# Patient Record
Sex: FEMALE | Race: ASIAN | NOT HISPANIC OR LATINO | Employment: FULL TIME | URBAN - METROPOLITAN AREA
[De-identification: names, ages, dates, MRNs, and addresses within clinical notes are randomized per-mention and may not be internally consistent; named-entity substitution may affect disease eponyms.]

---

## 2023-03-03 LAB — SARS-COV-2 RESULT: NOT DETECTED

## 2023-03-13 LAB
ERYTHROCYTE DISTRIBUTION WIDTH (RATIO) BY AUTOMATED COUNT: 14.6 % (ref 11.5–14.5)
ERYTHROCYTE MEAN CORPUSCULAR HEMOGLOBIN CONCENTRATION (G/DL) BY AUTOMATED: 30.6 G/DL (ref 32–36)
ERYTHROCYTE MEAN CORPUSCULAR VOLUME (FL) BY AUTOMATED COUNT: 88 FL (ref 80–100)
ERYTHROCYTES (10*6/UL) IN BLOOD BY AUTOMATED COUNT: 3.02 X10E12/L (ref 4–5.2)
HEMATOCRIT (%) IN BLOOD BY AUTOMATED COUNT: 26.5 % (ref 36–46)
HEMOGLOBIN (G/DL) IN BLOOD: 8.1 G/DL (ref 12–16)
LEUKOCYTES (10*3/UL) IN BLOOD BY AUTOMATED COUNT: 7.3 X10E9/L (ref 4.4–11.3)
PLATELETS (10*3/UL) IN BLOOD AUTOMATED COUNT: 253 X10E9/L (ref 150–450)

## 2023-03-14 LAB
ALANINE AMINOTRANSFERASE (SGPT) (U/L) IN SER/PLAS: 35 U/L (ref 7–45)
ALBUMIN (G/DL) IN SER/PLAS: 3.4 G/DL (ref 3.4–5)
ALKALINE PHOSPHATASE (U/L) IN SER/PLAS: 62 U/L (ref 33–110)
ANION GAP IN SER/PLAS: 16 MMOL/L (ref 10–20)
ASPARTATE AMINOTRANSFERASE (SGOT) (U/L) IN SER/PLAS: 21 U/L (ref 9–39)
BILIRUBIN TOTAL (MG/DL) IN SER/PLAS: 0.1 MG/DL (ref 0–1.2)
CALCIUM (MG/DL) IN SER/PLAS: 8.3 MG/DL (ref 8.6–10.3)
CARBON DIOXIDE, TOTAL (MMOL/L) IN SER/PLAS: 22 MMOL/L (ref 21–32)
CHLORIDE (MMOL/L) IN SER/PLAS: 104 MMOL/L (ref 98–107)
CREATININE (MG/DL) IN SER/PLAS: 0.75 MG/DL (ref 0.5–1.05)
ERYTHROCYTE DISTRIBUTION WIDTH (RATIO) BY AUTOMATED COUNT: 14.4 % (ref 11.5–14.5)
ERYTHROCYTE MEAN CORPUSCULAR HEMOGLOBIN CONCENTRATION (G/DL) BY AUTOMATED: 30.2 G/DL (ref 32–36)
ERYTHROCYTE MEAN CORPUSCULAR VOLUME (FL) BY AUTOMATED COUNT: 87 FL (ref 80–100)
ERYTHROCYTES (10*6/UL) IN BLOOD BY AUTOMATED COUNT: 3.38 X10E12/L (ref 4–5.2)
GFR FEMALE: >90 ML/MIN/1.73M2
GLUCOSE (MG/DL) IN SER/PLAS: 76 MG/DL (ref 74–99)
HEMATOCRIT (%) IN BLOOD BY AUTOMATED COUNT: 29.5 % (ref 36–46)
HEMOGLOBIN (G/DL) IN BLOOD: 8.9 G/DL (ref 12–16)
LEUKOCYTES (10*3/UL) IN BLOOD BY AUTOMATED COUNT: 7.4 X10E9/L (ref 4.4–11.3)
PLATELETS (10*3/UL) IN BLOOD AUTOMATED COUNT: 314 X10E9/L (ref 150–450)
POTASSIUM (MMOL/L) IN SER/PLAS: 4 MMOL/L (ref 3.5–5.3)
PROTEIN TOTAL: 6.3 G/DL (ref 6.4–8.2)
SODIUM (MMOL/L) IN SER/PLAS: 138 MMOL/L (ref 136–145)
UREA NITROGEN (MG/DL) IN SER/PLAS: 10 MG/DL (ref 6–23)

## 2023-03-15 LAB
ABO GROUP (TYPE) IN BLOOD: NORMAL
ANTIBODY SCREEN: NORMAL
RH FACTOR: NORMAL

## 2023-06-07 ENCOUNTER — OFFICE VISIT (OUTPATIENT)
Dept: PRIMARY CARE | Facility: CLINIC | Age: 38
End: 2023-06-07
Payer: COMMERCIAL

## 2023-06-07 VITALS
BODY MASS INDEX: 31.54 KG/M2 | WEIGHT: 178 LBS | SYSTOLIC BLOOD PRESSURE: 129 MMHG | HEIGHT: 63 IN | DIASTOLIC BLOOD PRESSURE: 84 MMHG

## 2023-06-07 DIAGNOSIS — D50.9 IRON DEFICIENCY ANEMIA, UNSPECIFIED IRON DEFICIENCY ANEMIA TYPE: ICD-10-CM

## 2023-06-07 DIAGNOSIS — Z00.00 ROUTINE GENERAL MEDICAL EXAMINATION AT A HEALTH CARE FACILITY: Primary | ICD-10-CM

## 2023-06-07 DIAGNOSIS — G43.809 OTHER MIGRAINE WITHOUT STATUS MIGRAINOSUS, NOT INTRACTABLE: ICD-10-CM

## 2023-06-07 LAB
ALANINE AMINOTRANSFERASE (SGPT) (U/L) IN SER/PLAS: 74 U/L (ref 7–45)
ALBUMIN (G/DL) IN SER/PLAS: 4.5 G/DL (ref 3.4–5)
ALKALINE PHOSPHATASE (U/L) IN SER/PLAS: 45 U/L (ref 33–110)
ANION GAP IN SER/PLAS: 14 MMOL/L (ref 10–20)
ASPARTATE AMINOTRANSFERASE (SGOT) (U/L) IN SER/PLAS: 50 U/L (ref 9–39)
BASOPHILS (10*3/UL) IN BLOOD BY AUTOMATED COUNT: 0.03 X10E9/L (ref 0–0.1)
BASOPHILS/100 LEUKOCYTES IN BLOOD BY AUTOMATED COUNT: 0.4 % (ref 0–2)
BILIRUBIN TOTAL (MG/DL) IN SER/PLAS: 0.4 MG/DL (ref 0–1.2)
CALCIDIOL (25 OH VITAMIN D3) (NG/ML) IN SER/PLAS: 26 NG/ML
CALCIUM (MG/DL) IN SER/PLAS: 9.9 MG/DL (ref 8.6–10.6)
CARBON DIOXIDE, TOTAL (MMOL/L) IN SER/PLAS: 25 MMOL/L (ref 21–32)
CHLORIDE (MMOL/L) IN SER/PLAS: 104 MMOL/L (ref 98–107)
CHOLESTEROL (MG/DL) IN SER/PLAS: 124 MG/DL (ref 0–199)
CHOLESTEROL IN HDL (MG/DL) IN SER/PLAS: 37.3 MG/DL
CHOLESTEROL/HDL RATIO: 3.3
CREATININE (MG/DL) IN SER/PLAS: 0.75 MG/DL (ref 0.5–1.05)
EOSINOPHILS (10*3/UL) IN BLOOD BY AUTOMATED COUNT: 0.17 X10E9/L (ref 0–0.7)
EOSINOPHILS/100 LEUKOCYTES IN BLOOD BY AUTOMATED COUNT: 2.2 % (ref 0–6)
ERYTHROCYTE DISTRIBUTION WIDTH (RATIO) BY AUTOMATED COUNT: 14.6 % (ref 11.5–14.5)
ERYTHROCYTE MEAN CORPUSCULAR HEMOGLOBIN CONCENTRATION (G/DL) BY AUTOMATED: 30.3 G/DL (ref 32–36)
ERYTHROCYTE MEAN CORPUSCULAR VOLUME (FL) BY AUTOMATED COUNT: 86 FL (ref 80–100)
ERYTHROCYTES (10*6/UL) IN BLOOD BY AUTOMATED COUNT: 4.13 X10E12/L (ref 4–5.2)
ESTIMATED AVERAGE GLUCOSE FOR HBA1C: 108 MG/DL
FERRITIN (UG/LL) IN SER/PLAS: 59 UG/L (ref 8–150)
GFR FEMALE: >90 ML/MIN/1.73M2
GLUCOSE (MG/DL) IN SER/PLAS: 84 MG/DL (ref 74–99)
HEMATOCRIT (%) IN BLOOD BY AUTOMATED COUNT: 35.6 % (ref 36–46)
HEMOGLOBIN (G/DL) IN BLOOD: 10.8 G/DL (ref 12–16)
HEMOGLOBIN A1C/HEMOGLOBIN TOTAL IN BLOOD: 5.4 %
IMMATURE GRANULOCYTES/100 LEUKOCYTES IN BLOOD BY AUTOMATED COUNT: 0.4 % (ref 0–0.9)
IRON (UG/DL) IN SER/PLAS: 84 UG/DL (ref 35–150)
IRON BINDING CAPACITY (UG/DL) IN SER/PLAS: 373 UG/DL (ref 240–445)
IRON SATURATION (%) IN SER/PLAS: 23 % (ref 25–45)
LDL: 32 MG/DL (ref 0–99)
LEUKOCYTES (10*3/UL) IN BLOOD BY AUTOMATED COUNT: 7.7 X10E9/L (ref 4.4–11.3)
LYMPHOCYTES (10*3/UL) IN BLOOD BY AUTOMATED COUNT: 2.48 X10E9/L (ref 1.2–4.8)
LYMPHOCYTES/100 LEUKOCYTES IN BLOOD BY AUTOMATED COUNT: 32.2 % (ref 13–44)
MONOCYTES (10*3/UL) IN BLOOD BY AUTOMATED COUNT: 0.57 X10E9/L (ref 0.1–1)
MONOCYTES/100 LEUKOCYTES IN BLOOD BY AUTOMATED COUNT: 7.4 % (ref 2–10)
NEUTROPHILS (10*3/UL) IN BLOOD BY AUTOMATED COUNT: 4.43 X10E9/L (ref 1.2–7.7)
NEUTROPHILS/100 LEUKOCYTES IN BLOOD BY AUTOMATED COUNT: 57.4 % (ref 40–80)
NON HDL CHOLESTEROL: 87 MG/DL
NRBC (PER 100 WBCS) BY AUTOMATED COUNT: 0 /100 WBC (ref 0–0)
PLATELETS (10*3/UL) IN BLOOD AUTOMATED COUNT: 171 X10E9/L (ref 150–450)
POTASSIUM (MMOL/L) IN SER/PLAS: 4.3 MMOL/L (ref 3.5–5.3)
PROTEIN TOTAL: 7 G/DL (ref 6.4–8.2)
SODIUM (MMOL/L) IN SER/PLAS: 139 MMOL/L (ref 136–145)
THYROTROPIN (MIU/L) IN SER/PLAS BY DETECTION LIMIT <= 0.05 MIU/L: 2.17 MIU/L (ref 0.44–3.98)
TRIGLYCERIDE (MG/DL) IN SER/PLAS: 275 MG/DL (ref 0–149)
UREA NITROGEN (MG/DL) IN SER/PLAS: 12 MG/DL (ref 6–23)
VLDL: 55 MG/DL (ref 0–40)

## 2023-06-07 PROCEDURE — 80061 LIPID PANEL: CPT

## 2023-06-07 PROCEDURE — 84443 ASSAY THYROID STIM HORMONE: CPT

## 2023-06-07 PROCEDURE — 82306 VITAMIN D 25 HYDROXY: CPT

## 2023-06-07 PROCEDURE — 99395 PREV VISIT EST AGE 18-39: CPT | Performed by: INTERNAL MEDICINE

## 2023-06-07 PROCEDURE — 83550 IRON BINDING TEST: CPT

## 2023-06-07 PROCEDURE — 80053 COMPREHEN METABOLIC PANEL: CPT

## 2023-06-07 PROCEDURE — 85025 COMPLETE CBC W/AUTO DIFF WBC: CPT

## 2023-06-07 PROCEDURE — 83036 HEMOGLOBIN GLYCOSYLATED A1C: CPT

## 2023-06-07 PROCEDURE — 83540 ASSAY OF IRON: CPT

## 2023-06-07 PROCEDURE — 82728 ASSAY OF FERRITIN: CPT

## 2023-06-07 RX ORDER — LEVOTHYROXINE SODIUM 25 UG/1
25 TABLET ORAL DAILY
COMMUNITY
Start: 2017-07-16 | End: 2023-08-08

## 2023-06-10 NOTE — PROGRESS NOTES
"    Solitario Zuniga is a 39 yo F presenting in follow up.     1. 2nd baby boy born 3.6.[birth year] c/b HTN now off, c/b postpartum anemia on PO iron x3 mo with persistent mildly elevated DBP since postpartum   -discotninued labetalol post-partum   -bps at home in pre-HTN range   [ ]watchful waiting    1. MDD concerns last visit 8.22     2. Hypothyroidism   -ltx 25     3. Vit D Def     4. Knee crepitus     5. Occ LBP     6. H/o migraine   -Nurtec     #HM   [ ]repeat labs   -tdap 2022     62.5\"   pre-preg weight 178         Gen Aox3 NAD well appearing   HEENT mmm pharynx clear no cervical LAD   Eyes sclerae clear   CV rrr nl s1/2 no m/r/g  Pulm CTAB no adventitious sounds   Ext warm dry no edema 2+ DP pulse         "

## 2023-07-14 ENCOUNTER — TELEPHONE (OUTPATIENT)
Dept: PRIMARY CARE | Facility: CLINIC | Age: 38
End: 2023-07-14
Payer: COMMERCIAL

## 2023-07-16 DIAGNOSIS — J01.90 ACUTE SINUSITIS, RECURRENCE NOT SPECIFIED, UNSPECIFIED LOCATION: Primary | ICD-10-CM

## 2023-07-16 DIAGNOSIS — R11.2 NAUSEA AND VOMITING, UNSPECIFIED VOMITING TYPE: ICD-10-CM

## 2023-07-16 RX ORDER — AZITHROMYCIN 250 MG/1
TABLET, FILM COATED ORAL
Qty: 6 TABLET | Refills: 0 | Status: SHIPPED | OUTPATIENT
Start: 2023-07-16 | End: 2023-07-21

## 2023-07-16 RX ORDER — ONDANSETRON 8 MG/1
8 TABLET, ORALLY DISINTEGRATING ORAL EVERY 12 HOURS PRN
Qty: 60 TABLET | Refills: 3 | Status: SHIPPED | OUTPATIENT
Start: 2023-07-16 | End: 2023-11-13

## 2023-07-22 LAB
ALANINE AMINOTRANSFERASE (SGPT) (U/L) IN SER/PLAS: 37 U/L (ref 7–45)
ALBUMIN (G/DL) IN SER/PLAS: 4.2 G/DL (ref 3.4–5)
ALKALINE PHOSPHATASE (U/L) IN SER/PLAS: 34 U/L (ref 33–110)
ANION GAP IN SER/PLAS: 11 MMOL/L (ref 10–20)
ASPARTATE AMINOTRANSFERASE (SGOT) (U/L) IN SER/PLAS: 24 U/L (ref 9–39)
BILIRUBIN TOTAL (MG/DL) IN SER/PLAS: 0.3 MG/DL (ref 0–1.2)
CALCIUM (MG/DL) IN SER/PLAS: 8.9 MG/DL (ref 8.6–10.3)
CARBON DIOXIDE, TOTAL (MMOL/L) IN SER/PLAS: 25 MMOL/L (ref 21–32)
CHLORIDE (MMOL/L) IN SER/PLAS: 102 MMOL/L (ref 98–107)
CHOLESTEROL (MG/DL) IN SER/PLAS: 131 MG/DL (ref 0–199)
CHOLESTEROL IN HDL (MG/DL) IN SER/PLAS: 34.4 MG/DL
CHOLESTEROL/HDL RATIO: 3.8
CREATININE (MG/DL) IN SER/PLAS: 0.74 MG/DL (ref 0.5–1.05)
GFR FEMALE: >90 ML/MIN/1.73M2
GLUCOSE (MG/DL) IN SER/PLAS: 100 MG/DL (ref 74–99)
LDL: 68 MG/DL (ref 0–99)
POTASSIUM (MMOL/L) IN SER/PLAS: 4.1 MMOL/L (ref 3.5–5.3)
PROTEIN TOTAL: 6.7 G/DL (ref 6.4–8.2)
SODIUM (MMOL/L) IN SER/PLAS: 134 MMOL/L (ref 136–145)
TRIGLYCERIDE (MG/DL) IN SER/PLAS: 143 MG/DL (ref 0–149)
UREA NITROGEN (MG/DL) IN SER/PLAS: 10 MG/DL (ref 6–23)
VLDL: 29 MG/DL (ref 0–40)

## 2023-08-01 ENCOUNTER — TELEPHONE (OUTPATIENT)
Dept: PRIMARY CARE | Facility: CLINIC | Age: 38
End: 2023-08-01
Payer: COMMERCIAL

## 2023-08-07 DIAGNOSIS — Z86.39 PERSONAL HISTORY OF OTHER ENDOCRINE, NUTRITIONAL AND METABOLIC DISEASE: ICD-10-CM

## 2023-08-08 RX ORDER — LEVOTHYROXINE SODIUM 25 UG/1
25 TABLET ORAL DAILY
Qty: 90 TABLET | Refills: 3 | Status: SHIPPED | OUTPATIENT
Start: 2023-08-08 | End: 2024-03-29 | Stop reason: SDUPTHER

## 2023-09-06 ENCOUNTER — OFFICE VISIT (OUTPATIENT)
Dept: PRIMARY CARE | Facility: CLINIC | Age: 38
End: 2023-09-06
Payer: COMMERCIAL

## 2023-09-06 DIAGNOSIS — I10 BENIGN ESSENTIAL HYPERTENSION: Primary | ICD-10-CM

## 2023-09-06 DIAGNOSIS — R06.81 WITNESSED EPISODE OF APNEA: ICD-10-CM

## 2023-09-06 DIAGNOSIS — K42.9 UMBILICAL HERNIA WITHOUT OBSTRUCTION AND WITHOUT GANGRENE: ICD-10-CM

## 2023-09-06 DIAGNOSIS — M54.12 ACUTE CERVICAL RADICULOPATHY: ICD-10-CM

## 2023-09-06 PROCEDURE — 99214 OFFICE O/P EST MOD 30 MIN: CPT | Performed by: INTERNAL MEDICINE

## 2023-09-06 RX ORDER — LOSARTAN POTASSIUM 50 MG/1
50 TABLET ORAL DAILY
Qty: 90 TABLET | Refills: 0 | Status: SHIPPED | OUTPATIENT
Start: 2023-09-06 | End: 2023-11-29

## 2023-09-06 RX ORDER — METHYLPREDNISOLONE 4 MG/1
TABLET ORAL
Qty: 21 TABLET | Refills: 0 | Status: SHIPPED | OUTPATIENT
Start: 2023-09-06 | End: 2023-09-13

## 2023-09-06 NOTE — PROGRESS NOTES
"Solitario Zuniga is a 37 yo F presenting for several acute concerns.    *Elevated BP since baby born   -home BP log reviewed, particularly DBP remains >85 consistently     *Concern for sleep apnea   - notes apneic episodes   -+daytime somno   -no previous PSG    *Neck pain   -right sided  -radiates into shoulder   -paresthesias in scapular region   -no weakness in hand   -h/o acute lumbar radic in the past       Baby born 3.6.[birth year] c/b HTN required labetalol during pregnancy - home BPS episodically trending high   New dx HTN - home DBPs consistently >85-90 with more labile SBPs but mostly averaging above goal.   MDD concerns 8.22   Hypothyroidism - ltx 25 mcg; TSH WNL 6.23  Vit D Def   Knee crepitus   Occ LBP   H/o migraine - Nurtec PRN       #HM   -tdap 2022   -pap 7.23   -screen labs 6.23        62.5\"       Gen Aox3 NAD well appearing   HEENT mmm pharynx clear no cervical LAD   Neuro no gross deficits       A/P.     New dx HTN - start losart 50 at bedtime; obtain BMP in 2 weeks; RTO for BP check in 1 mo and continue home monitoring   Suspected acute cervical radiculopathy - trial Medrol kayce. PT referral with handout on home stretches also provided.   Suspected FAWAD - home PSG ordered    "

## 2023-09-06 NOTE — PATIENT INSTRUCTIONS
Solitario,     Start losartan 50 mg once daily at bedtime. Keep checking your blood pressures and see me back in 1 month.   Do labs in 2 weeks at any  lab.   Try the Medrol kyace for the neck pain, the exercises I am printing, and call the referral line to schedule with physical therapy. If pain doesn't get better after 4-6 weeks physical therapy, call me and we can consider next steps (like MRI of the spine).   I am sending away for a home sleep apnea test. I will let you know results.   Call the referral line to schedule with Dr. Grzegorz Lynch for umbilical hernia.       CL

## 2023-10-09 ENCOUNTER — LAB (OUTPATIENT)
Dept: LAB | Facility: LAB | Age: 38
End: 2023-10-09
Payer: COMMERCIAL

## 2023-10-09 DIAGNOSIS — I10 BENIGN ESSENTIAL HYPERTENSION: ICD-10-CM

## 2023-10-09 LAB
ANION GAP SERPL CALC-SCNC: 12 MMOL/L (ref 10–20)
BUN SERPL-MCNC: 12 MG/DL (ref 6–23)
CALCIUM SERPL-MCNC: 9.2 MG/DL (ref 8.6–10.3)
CHLORIDE SERPL-SCNC: 105 MMOL/L (ref 98–107)
CO2 SERPL-SCNC: 24 MMOL/L (ref 21–32)
CREAT SERPL-MCNC: 0.85 MG/DL (ref 0.5–1.05)
GFR SERPL CREATININE-BSD FRML MDRD: 90 ML/MIN/1.73M*2
GLUCOSE SERPL-MCNC: 100 MG/DL (ref 74–99)
POTASSIUM SERPL-SCNC: 4.4 MMOL/L (ref 3.5–5.3)
SODIUM SERPL-SCNC: 137 MMOL/L (ref 136–145)
TSH SERPL-ACNC: 3.63 MIU/L (ref 0.44–3.98)

## 2023-10-09 PROCEDURE — 84443 ASSAY THYROID STIM HORMONE: CPT

## 2023-10-09 PROCEDURE — 36415 COLL VENOUS BLD VENIPUNCTURE: CPT

## 2023-10-09 PROCEDURE — 80048 BASIC METABOLIC PNL TOTAL CA: CPT

## 2023-10-26 LAB
GENETICS TEST NAME-DATA CONVERSION: NORMAL
LAB MOLECULAR CA TECHNICAL NOTES: NORMAL

## 2023-11-28 DIAGNOSIS — I10 BENIGN ESSENTIAL HYPERTENSION: ICD-10-CM

## 2023-11-29 RX ORDER — LOSARTAN POTASSIUM 50 MG/1
50 TABLET ORAL DAILY
Qty: 90 TABLET | Refills: 0 | Status: SHIPPED | OUTPATIENT
Start: 2023-11-29 | End: 2024-03-29 | Stop reason: WASHOUT

## 2024-03-29 ENCOUNTER — OFFICE VISIT (OUTPATIENT)
Dept: PRIMARY CARE | Facility: CLINIC | Age: 39
End: 2024-03-29
Payer: COMMERCIAL

## 2024-03-29 VITALS
BODY MASS INDEX: 30.6 KG/M2 | WEIGHT: 170 LBS | DIASTOLIC BLOOD PRESSURE: 84 MMHG | HEART RATE: 76 BPM | SYSTOLIC BLOOD PRESSURE: 126 MMHG

## 2024-03-29 DIAGNOSIS — M25.562 LEFT KNEE PAIN, UNSPECIFIED CHRONICITY: ICD-10-CM

## 2024-03-29 DIAGNOSIS — E03.9 ADULT HYPOTHYROIDISM: ICD-10-CM

## 2024-03-29 DIAGNOSIS — N92.6 ABNORMAL MENSTRUAL PERIODS: ICD-10-CM

## 2024-03-29 DIAGNOSIS — Z00.00 ROUTINE GENERAL MEDICAL EXAMINATION AT A HEALTH CARE FACILITY: Primary | ICD-10-CM

## 2024-03-29 DIAGNOSIS — J31.0 CHRONIC RHINITIS: ICD-10-CM

## 2024-03-29 DIAGNOSIS — G43.809 OTHER MIGRAINE WITHOUT STATUS MIGRAINOSUS, NOT INTRACTABLE: ICD-10-CM

## 2024-03-29 DIAGNOSIS — Z86.39 PERSONAL HISTORY OF OTHER ENDOCRINE, NUTRITIONAL AND METABOLIC DISEASE: ICD-10-CM

## 2024-03-29 PROCEDURE — 99395 PREV VISIT EST AGE 18-39: CPT | Performed by: INTERNAL MEDICINE

## 2024-03-29 RX ORDER — FLUTICASONE PROPIONATE 50 MCG
SPRAY, SUSPENSION (ML) NASAL
Qty: 16 G | Refills: 11 | Status: SHIPPED | OUTPATIENT
Start: 2024-03-29

## 2024-03-29 RX ORDER — LEVOTHYROXINE SODIUM 25 UG/1
25 TABLET ORAL DAILY
Qty: 90 TABLET | Refills: 3 | Status: SHIPPED | OUTPATIENT
Start: 2024-03-29

## 2024-03-29 NOTE — PATIENT INSTRUCTIONS
Solitario,     We will do full lab work today.     You can continue to take the Nurtec as you are, as needed only, but another way people take it is to take it every other day, all the time, as a preventive dose. When you take it every other day, you can prevent migraine headaches from happening. I am sending the prescription this way, in case you try that.     I recommend seeing an orthopedist for the left knee - I am placing a referral and you will be called to schedule. Dr. Gage Mckeon.     For the back pain, if home stretching and yoga fails, I would recommend a trial of physical therapy. You can call the referral line to schedule if you ever decide to try this. You can call the office if you desire that in the future.     I recommend following with Dr. Lesly Mai or Dr. Hiral Davis in this office with another visit in 6-12 months!     Continue to check your blood pressure at home occasionally, first thing in the AM - if you find that it is running >140 on top or >90 on the bottom, let us know.       CL

## 2024-03-29 NOTE — PROGRESS NOTES
Solitario Zuniga is a 39 yo F presenting for wellness visit.     *L knee and low back pain     *  1. Elevated BP without dx HTN, gestational HTN - new dx HTN 9.23   -started losart 50 at 9.23 visit  -labetalol during pregnancy   -went to [place] over holidays, with reduced stress felt did not need medication anymore - self discontinued       2. Concern for FAWAD     3. Cervicalgia/neck pain     4. MDD concerns 8.22     5. Hypothyroidism   -ltx 25 mcg     6. Vit D Def     7. Knee Crepitus     8. Occ LBP     9. Migraine   -Nurtec trial 6.23   -working well - taking approx 10 days per month   -not int in every other day dosing     #HM   -tdap 2022   -pap 7.23   [ ]due labs       62.5   178 last year       Gen Aox3 NAD well appearing   HEENT mmm pharynx clear no cervical LAD   Eyes sclerae clear   CV rrr nl s1/2 no m/r/g  Pulm CTAB no adventitious sounds   Ext warm dry no edema 2+ DP pulse         A/P     Teodoroely,     We will do full lab work today.     You can continue to take the Nurtec as you are, as needed only, but another way people take it is to take it every other day, all the time, as a preventive dose. When you take it every other day, you can prevent migraine headaches from happening. I am sending the prescription this way, in case you try that.     I recommend seeing an orthopedist for the left knee - I am placing a referral and you will be called to schedule. Dr. Gage Mckeon.     For the back pain, if home stretching and yoga fails, I would recommend a trial of physical therapy. You can call the referral line to schedule if you ever decide to try this. You can call the office if you desire that in the future.     I recommend following with Dr. Lesly Mai or Dr. Hiral Davis in this office with another visit in 6-12 months!     Continue to check your blood pressure at home occasionally, first thing in the AM - if you find that it is running >140 on top or >90 on the bottom, let us know.

## 2024-04-02 ENCOUNTER — LAB (OUTPATIENT)
Dept: LAB | Facility: LAB | Age: 39
End: 2024-04-02
Payer: COMMERCIAL

## 2024-04-02 DIAGNOSIS — N92.6 ABNORMAL MENSTRUAL PERIODS: ICD-10-CM

## 2024-04-02 DIAGNOSIS — Z00.00 ROUTINE GENERAL MEDICAL EXAMINATION AT A HEALTH CARE FACILITY: ICD-10-CM

## 2024-04-02 LAB
25(OH)D3 SERPL-MCNC: 30 NG/ML (ref 30–100)
ALBUMIN SERPL BCP-MCNC: 4.6 G/DL (ref 3.4–5)
ALP SERPL-CCNC: 36 U/L (ref 33–110)
ALT SERPL W P-5'-P-CCNC: 23 U/L (ref 7–45)
ANION GAP SERPL CALC-SCNC: 10 MMOL/L (ref 10–20)
AST SERPL W P-5'-P-CCNC: 19 U/L (ref 9–39)
B-HCG SERPL-ACNC: <2 MIU/ML
BASOPHILS # BLD AUTO: 0.02 X10*3/UL (ref 0–0.1)
BASOPHILS NFR BLD AUTO: 0.3 %
BILIRUB SERPL-MCNC: 0.4 MG/DL (ref 0–1.2)
BUN SERPL-MCNC: 10 MG/DL (ref 6–23)
CALCIUM SERPL-MCNC: 8.9 MG/DL (ref 8.6–10.3)
CHLORIDE SERPL-SCNC: 103 MMOL/L (ref 98–107)
CHOLEST SERPL-MCNC: 142 MG/DL (ref 0–199)
CHOLESTEROL/HDL RATIO: 3.9
CO2 SERPL-SCNC: 29 MMOL/L (ref 21–32)
CREAT SERPL-MCNC: 0.89 MG/DL (ref 0.5–1.05)
EGFRCR SERPLBLD CKD-EPI 2021: 85 ML/MIN/1.73M*2
EOSINOPHIL # BLD AUTO: 0.19 X10*3/UL (ref 0–0.7)
EOSINOPHIL NFR BLD AUTO: 3.3 %
ERYTHROCYTE [DISTWIDTH] IN BLOOD BY AUTOMATED COUNT: 12.9 % (ref 11.5–14.5)
EST. AVERAGE GLUCOSE BLD GHB EST-MCNC: 117 MG/DL
GLUCOSE SERPL-MCNC: 91 MG/DL (ref 74–99)
HBA1C MFR BLD: 5.7 %
HCT VFR BLD AUTO: 40.2 % (ref 36–46)
HDLC SERPL-MCNC: 36.5 MG/DL
HGB BLD-MCNC: 12.4 G/DL (ref 12–16)
IMM GRANULOCYTES # BLD AUTO: 0.01 X10*3/UL (ref 0–0.7)
IMM GRANULOCYTES NFR BLD AUTO: 0.2 % (ref 0–0.9)
IRON SATN MFR SERPL: 19 % (ref 25–45)
IRON SERPL-MCNC: 73 UG/DL (ref 35–150)
LDLC SERPL CALC-MCNC: 82 MG/DL
LYMPHOCYTES # BLD AUTO: 2.13 X10*3/UL (ref 1.2–4.8)
LYMPHOCYTES NFR BLD AUTO: 37.2 %
MCH RBC QN AUTO: 26.3 PG (ref 26–34)
MCHC RBC AUTO-ENTMCNC: 30.8 G/DL (ref 32–36)
MCV RBC AUTO: 85 FL (ref 80–100)
MONOCYTES # BLD AUTO: 0.43 X10*3/UL (ref 0.1–1)
MONOCYTES NFR BLD AUTO: 7.5 %
NEUTROPHILS # BLD AUTO: 2.95 X10*3/UL (ref 1.2–7.7)
NEUTROPHILS NFR BLD AUTO: 51.5 %
NON HDL CHOLESTEROL: 106 MG/DL (ref 0–149)
NRBC BLD-RTO: 0 /100 WBCS (ref 0–0)
PLATELET # BLD AUTO: 199 X10*3/UL (ref 150–450)
POTASSIUM SERPL-SCNC: 4.5 MMOL/L (ref 3.5–5.3)
PROT SERPL-MCNC: 7.1 G/DL (ref 6.4–8.2)
RBC # BLD AUTO: 4.71 X10*6/UL (ref 4–5.2)
SODIUM SERPL-SCNC: 137 MMOL/L (ref 136–145)
TIBC SERPL-MCNC: 386 UG/DL (ref 240–445)
TRIGL SERPL-MCNC: 117 MG/DL (ref 0–149)
TSH SERPL-ACNC: 3.33 MIU/L (ref 0.44–3.98)
UIBC SERPL-MCNC: 313 UG/DL (ref 110–370)
VLDL: 23 MG/DL (ref 0–40)
WBC # BLD AUTO: 5.7 X10*3/UL (ref 4.4–11.3)

## 2024-04-02 PROCEDURE — 84702 CHORIONIC GONADOTROPIN TEST: CPT

## 2024-04-02 PROCEDURE — 84443 ASSAY THYROID STIM HORMONE: CPT

## 2024-04-02 PROCEDURE — 80053 COMPREHEN METABOLIC PANEL: CPT

## 2024-04-02 PROCEDURE — 85025 COMPLETE CBC W/AUTO DIFF WBC: CPT

## 2024-04-02 PROCEDURE — 36415 COLL VENOUS BLD VENIPUNCTURE: CPT

## 2024-04-02 PROCEDURE — 83550 IRON BINDING TEST: CPT

## 2024-04-02 PROCEDURE — 82306 VITAMIN D 25 HYDROXY: CPT

## 2024-04-02 PROCEDURE — 83540 ASSAY OF IRON: CPT

## 2024-04-02 PROCEDURE — 83036 HEMOGLOBIN GLYCOSYLATED A1C: CPT

## 2024-04-02 PROCEDURE — 80061 LIPID PANEL: CPT

## 2024-05-14 ENCOUNTER — HOSPITAL ENCOUNTER (OUTPATIENT)
Dept: RADIOLOGY | Facility: HOSPITAL | Age: 39
Discharge: HOME | End: 2024-05-14
Payer: COMMERCIAL

## 2024-05-14 ENCOUNTER — OFFICE VISIT (OUTPATIENT)
Dept: ORTHOPEDIC SURGERY | Facility: HOSPITAL | Age: 39
End: 2024-05-14
Payer: COMMERCIAL

## 2024-05-14 VITALS — WEIGHT: 170 LBS | HEIGHT: 62 IN | BODY MASS INDEX: 31.28 KG/M2

## 2024-05-14 DIAGNOSIS — M17.12 OSTEOARTHRITIS OF LEFT PATELLOFEMORAL JOINT: Primary | ICD-10-CM

## 2024-05-14 DIAGNOSIS — M25.562 LEFT KNEE PAIN, UNSPECIFIED CHRONICITY: ICD-10-CM

## 2024-05-14 PROCEDURE — 99213 OFFICE O/P EST LOW 20 MIN: CPT | Performed by: FAMILY MEDICINE

## 2024-05-14 PROCEDURE — 99203 OFFICE O/P NEW LOW 30 MIN: CPT | Performed by: FAMILY MEDICINE

## 2024-05-14 PROCEDURE — 73564 X-RAY EXAM KNEE 4 OR MORE: CPT | Mod: LT

## 2024-05-14 PROCEDURE — 73564 X-RAY EXAM KNEE 4 OR MORE: CPT | Mod: LEFT SIDE | Performed by: RADIOLOGY

## 2024-05-14 RX ORDER — MELOXICAM 15 MG/1
15 TABLET ORAL DAILY
Qty: 30 TABLET | Refills: 1 | Status: SHIPPED | OUTPATIENT
Start: 2024-05-14 | End: 2024-07-13

## 2024-05-14 ASSESSMENT — PAIN - FUNCTIONAL ASSESSMENT: PAIN_FUNCTIONAL_ASSESSMENT: 0-10

## 2024-05-14 NOTE — PROGRESS NOTES
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  Denies any known injury/trauma to the left knee.  States she has taken ibuprofen 200-400 mg once daily as needed with slight improvement.  Pain is worse with walking up stairs.  Patient participates regularly with yoga for exercise, however has had increased pain with yoga recently, therefore has decreased participation.  She denies any known swelling, redness, warmth, radiation of pain, numbness, tingling, weakness.  States pain is primarily anterolateral and associated with clicking/popping sensation.  Denies any mechanical locking/catching.  She has not previously been seen for this issue.  She denies any similar pain in her right knee.    She has no other complaints.    Physical Examination:     The LEFT knee is without obvious signs of acute bony deformity, swelling, erythema, ecchymosis or joint effusion. The patella is without tenderness. Apprehension is negative with medial glide, and positive with lateral glide. Patella crepitus is positive. Patella grind is positive. The medial joint line is nontender and without bony crepitus or step-off. The lateral joint line is nontender and without bony crepitus or step-off. Flexion & extension are full and symmetrical. Varus & valgus stress test at 0° and 30° of flexion, Lachman's, Luz's, and posterior drawer are all negative. The opposite knee is nontender and stable. Gait is minimally painful and tandem.     Imaging:  Radiographs of the left knee were reviewed and revealed patellofemoral DJD, primarily over the lateral aspect, without acute osseous abnormalities.  The studies were reviewed by me personally in the office today.    Problem List Items Addressed This Visit    None  Visit Diagnoses         Codes    Osteoarthritis of left  patellofemoral joint    -  Primary M17.12    Relevant Medications    meloxicam (Mobic) 15 mg tablet    Other Relevant Orders    Referral to Physical Therapy    Left knee pain, unspecified chronicity     M25.562    Relevant Orders    XR knee left 4+ views          Assessment and Plan:     We reviewed the exam and x-ray findings and discussed the conservative and surgical treatment options. We agreed to conservative management for chronic left knee pain with patellofemoral DJD.  Discussed supplementation, and recommended daily glucosamine and chondroitin sulfate as she denies any history of shellfish allergy, and is not pregnant or planning to become pregnant (Mirena IUD placed 2023).  Prescribed course of meloxicam 15 mg to be taken daily, and recommended taking this medication daily with food for the next 3 to 4 weeks, then may switch to taking once daily as needed for pain.  She may also try prescription doses of Tylenol and Voltaren gel.  Recommend ice, rest, compression as needed for acute flares of pain.  Physical therapy referral provided today and discussed the importance of regular home exercise.  Plan for follow-up as needed if/when pain returns or worsens, or if any new concerns arise.    **This note was dictated using Dragon speech recognition software and was not corrected for spelling or grammatical errors**.    Pk Garcia MD  Sports Medicine Specialist  [facility] [place]

## 2024-05-31 ENCOUNTER — APPOINTMENT (OUTPATIENT)
Dept: ORTHOPEDIC SURGERY | Facility: CLINIC | Age: 39
End: 2024-05-31
Payer: COMMERCIAL

## 2024-06-26 ENCOUNTER — OFFICE VISIT (OUTPATIENT)
Dept: OBSTETRICS AND GYNECOLOGY | Facility: CLINIC | Age: 39
End: 2024-06-26
Payer: COMMERCIAL

## 2024-06-26 VITALS
HEART RATE: 73 BPM | DIASTOLIC BLOOD PRESSURE: 96 MMHG | WEIGHT: 172.2 LBS | SYSTOLIC BLOOD PRESSURE: 137 MMHG | BODY MASS INDEX: 31.5 KG/M2

## 2024-06-26 DIAGNOSIS — R10.2 PELVIC PAIN: ICD-10-CM

## 2024-06-26 DIAGNOSIS — I10 PRIMARY HYPERTENSION: ICD-10-CM

## 2024-06-26 DIAGNOSIS — N92.1 BREAKTHROUGH BLEEDING ASSOCIATED WITH INTRAUTERINE DEVICE (IUD): Primary | ICD-10-CM

## 2024-06-26 DIAGNOSIS — Z97.5 BREAKTHROUGH BLEEDING ASSOCIATED WITH INTRAUTERINE DEVICE (IUD): Primary | ICD-10-CM

## 2024-06-26 DIAGNOSIS — N76.0 ACUTE VAGINITIS: ICD-10-CM

## 2024-06-26 PROCEDURE — 87205 SMEAR GRAM STAIN: CPT | Performed by: OBSTETRICS & GYNECOLOGY

## 2024-06-26 PROCEDURE — 1036F TOBACCO NON-USER: CPT | Performed by: OBSTETRICS & GYNECOLOGY

## 2024-06-26 PROCEDURE — 99214 OFFICE O/P EST MOD 30 MIN: CPT | Mod: TH | Performed by: OBSTETRICS & GYNECOLOGY

## 2024-06-26 PROCEDURE — 99214 OFFICE O/P EST MOD 30 MIN: CPT | Performed by: OBSTETRICS & GYNECOLOGY

## 2024-06-26 PROCEDURE — 3080F DIAST BP >= 90 MM HG: CPT | Performed by: OBSTETRICS & GYNECOLOGY

## 2024-06-26 PROCEDURE — 87109 MYCOPLASMA: CPT | Performed by: OBSTETRICS & GYNECOLOGY

## 2024-06-26 PROCEDURE — 3075F SYST BP GE 130 - 139MM HG: CPT | Performed by: OBSTETRICS & GYNECOLOGY

## 2024-06-26 RX ORDER — LOSARTAN POTASSIUM 50 MG/1
50 TABLET ORAL DAILY
Qty: 30 TABLET | Refills: 11 | Status: SHIPPED | OUTPATIENT
Start: 2024-06-26 | End: 2025-06-26

## 2024-06-26 RX ORDER — LOSARTAN POTASSIUM 50 MG/1
50 TABLET ORAL DAILY
COMMUNITY
Start: 2023-09-06 | End: 2024-06-26 | Stop reason: SDUPTHER

## 2024-06-26 ASSESSMENT — ENCOUNTER SYMPTOMS
EYES NEGATIVE: 0
RESPIRATORY NEGATIVE: 0
CONSTITUTIONAL NEGATIVE: 0
CARDIOVASCULAR NEGATIVE: 0
PSYCHIATRIC NEGATIVE: 0
MUSCULOSKELETAL NEGATIVE: 0
DEPRESSION: 0
HEMATOLOGIC/LYMPHATIC NEGATIVE: 0
ENDOCRINE NEGATIVE: 0
GASTROINTESTINAL NEGATIVE: 0
OCCASIONAL FEELINGS OF UNSTEADINESS: 0
NEUROLOGICAL NEGATIVE: 0
LOSS OF SENSATION IN FEET: 0
ALLERGIC/IMMUNOLOGIC NEGATIVE: 0

## 2024-06-26 ASSESSMENT — COLUMBIA-SUICIDE SEVERITY RATING SCALE - C-SSRS
6. HAVE YOU EVER DONE ANYTHING, STARTED TO DO ANYTHING, OR PREPARED TO DO ANYTHING TO END YOUR LIFE?: NO
1. IN THE PAST MONTH, HAVE YOU WISHED YOU WERE DEAD OR WISHED YOU COULD GO TO SLEEP AND NOT WAKE UP?: NO
2. HAVE YOU ACTUALLY HAD ANY THOUGHTS OF KILLING YOURSELF?: NO

## 2024-06-26 ASSESSMENT — PAIN SCALES - GENERAL: PAINLEVEL: 0-NO PAIN

## 2024-06-26 ASSESSMENT — PATIENT HEALTH QUESTIONNAIRE - PHQ9
1. LITTLE INTEREST OR PLEASURE IN DOING THINGS: NOT AT ALL
2. FEELING DOWN, DEPRESSED OR HOPELESS: NOT AT ALL
SUM OF ALL RESPONSES TO PHQ9 QUESTIONS 1 AND 2: 0

## 2024-06-26 NOTE — PROGRESS NOTES
Assessment   37 YO  with Mirena in place, with sx of vaginal discharge, prolonged spotting, occ pelvic pain    - Pelvic US  - vaginitis swabs collected  - Resume Losartan so patient can have better controlled HTN and more options for contraceptive mgmt  - Pt was offered Mirena removal today with initiation of new contraceptive method but she would like to see how the work up looks first    FU as indicated    Frieda Padgett MD    Subjective   37 YO  here for AUB with Mirena in place (placed 2023).  Prolonged spotting for up to 2 weeks each month, ever since IUD placement.  No dysmenorrhea.  Last month and this month, did have sudden severe pain on the right side. She took ibuprofen which relieved it.  Gets some migraines a few days into her period.    GynHx:  Menstrual Hx: historically regular but heavy menses   STIs: denies  Contraception: Mirena placed 2023  Sexual History/Complaints: men, monogamous, no complaints     PapHx: 2023 - neg cotest    PMH:  MDD - not requiring meds  Migraines - on Nurtec  Hypothyroidism - on 25mcg Synthroid  HTN - on Losartan 50mg every day     PSH: none    Objective   BP (!) 137/96   Pulse 73   Wt 78.1 kg (172 lb 3.2 oz)   LMP  (LMP Unknown)   BMI 31.50 kg/m²      General:   Alert and oriented, in no acute distress   Neck: Supple. No visible thyromegaly.    Breast/Axilla: Normal to palpation bilaterally without masses, skin changes, or nipple discharge.    Abdomen: Soft, non-tender, without masses or organomegaly   Vulva: Normal architecture without erythema, masses, or lesions.    Vagina: Normal mucosa without lesions, masses, or atrophy. + thick mucoid white discharge at cx wrapped around IUD strings.    Cervix: Normal without masses, lesions, or signs of cervicitis. Pt did have tenderness with movement of the cervix.   Uterus: Normal mobile, non-enlarged uterus    Adnexa: Normal without masses or lesions   Pelvic Floor No POP noted. No high tone pelvic floor     Psych Normal affect. Normal mood.

## 2024-06-27 LAB
CLUE CELLS VAG LPF-#/AREA: NORMAL /[LPF]
NUGENT SCORE: 0
YEAST VAG WET PREP-#/AREA: NORMAL

## 2024-06-28 ENCOUNTER — TELEPHONE (OUTPATIENT)
Dept: OBSTETRICS AND GYNECOLOGY | Facility: CLINIC | Age: 39
End: 2024-06-28
Payer: COMMERCIAL

## 2024-06-28 ENCOUNTER — HOSPITAL ENCOUNTER (OUTPATIENT)
Dept: RADIOLOGY | Facility: CLINIC | Age: 39
Discharge: HOME | End: 2024-06-28
Payer: COMMERCIAL

## 2024-06-28 DIAGNOSIS — R10.2 PELVIC PAIN: ICD-10-CM

## 2024-06-28 DIAGNOSIS — R10.2 PELVIC PAIN: Primary | ICD-10-CM

## 2024-06-28 PROCEDURE — 76856 US EXAM PELVIC COMPLETE: CPT

## 2024-06-28 NOTE — PROGRESS NOTES
Spoke to the patient and let her know Dr. Padgett would like to start her on antibiotics for 14 days while we wait for the results from her imaging and tests.

## 2024-06-29 LAB — UREAPLASMA/MYCOPLASMA CULTURE: NORMAL

## 2024-07-01 LAB — UREAPLASMA/MYCOPLASMA CULTURE: NORMAL

## 2024-07-01 RX ORDER — DOXYCYCLINE 100 MG/1
100 CAPSULE ORAL 2 TIMES DAILY
Qty: 14 CAPSULE | Refills: 0 | Status: SHIPPED | OUTPATIENT
Start: 2024-07-01 | End: 2024-07-15

## 2024-07-02 DIAGNOSIS — R10.2 PELVIC PAIN: ICD-10-CM

## 2024-07-10 DIAGNOSIS — M17.12 OSTEOARTHRITIS OF LEFT PATELLOFEMORAL JOINT: ICD-10-CM

## 2024-07-10 RX ORDER — MELOXICAM 15 MG/1
15 TABLET ORAL DAILY
Qty: 30 TABLET | Refills: 1 | Status: SHIPPED | OUTPATIENT
Start: 2024-07-10 | End: 2024-09-08

## 2024-07-12 ENCOUNTER — TELEPHONE (OUTPATIENT)
Dept: OBSTETRICS AND GYNECOLOGY | Facility: CLINIC | Age: 39
End: 2024-07-12
Payer: COMMERCIAL

## 2024-07-12 DIAGNOSIS — R10.2 PELVIC PAIN: ICD-10-CM

## 2024-07-12 RX ORDER — DOXYCYCLINE 100 MG/1
100 CAPSULE ORAL 2 TIMES DAILY
Qty: 14 CAPSULE | Refills: 0 | Status: SHIPPED | OUTPATIENT
Start: 2024-07-12 | End: 2024-07-19

## 2024-07-12 NOTE — TELEPHONE ENCOUNTER
Patient advised that she does need another 7 days of doxycycline per Dr. Padgett.  Prescription will be sent today.

## 2024-07-23 ENCOUNTER — EVALUATION (OUTPATIENT)
Dept: PHYSICAL THERAPY | Facility: CLINIC | Age: 39
End: 2024-07-23
Payer: COMMERCIAL

## 2024-07-23 DIAGNOSIS — M17.12 OSTEOARTHRITIS OF LEFT PATELLOFEMORAL JOINT: ICD-10-CM

## 2024-07-23 PROCEDURE — 97161 PT EVAL LOW COMPLEX 20 MIN: CPT | Mod: GP | Performed by: PHYSICAL THERAPIST

## 2024-07-23 PROCEDURE — 97110 THERAPEUTIC EXERCISES: CPT | Mod: GP | Performed by: PHYSICAL THERAPIST

## 2024-07-23 ASSESSMENT — ENCOUNTER SYMPTOMS
OCCASIONAL FEELINGS OF UNSTEADINESS: 0
LOSS OF SENSATION IN FEET: 1
DEPRESSION: 0

## 2024-07-23 NOTE — PROGRESS NOTES
Physical Therapy    Physical Therapy Evaluation and Treatment      Patient Name: Solitario Zuniga  MRN: [id number]  Today's Date: 7/23/2024  Visit: 1  Insurance: Reviewed  Physician: Pk Garcia  Problem List Items Addressed This Visit    None  Visit Diagnoses         Codes    Osteoarthritis of left patellofemoral joint     M17.12    Relevant Orders    Follow Up In Physical Therapy               Time Entry:                             Assessment: Patient seen in PT for Initial Evaluation for knee pain secondary to PF oa.   Patient presents with postural deviation   , decreased knee strength, retropatellar tenderness.  Functionally, patient  unable to do yoga.  Patient rates LEFS at 82.5%.    PT Assessment  Rehab Prognosis: Good  Evaluation/Treatment Tolerance: Patient tolerated treatment well     Plan:  Continue with POC  SciFIt stepper, knee ROM/strength, VMO strength, CKC, PRE's,, CP    OP PT Plan  PT Plan: Skilled PT  PT Frequency: 1 time per week  Duration: 6  Onset Date: 05/14/24  Rehab Potential: Good  Plan of Care Agreement: Patient    Current Problem:   1. Osteoarthritis of left patellofemoral joint  Referral to Physical Therapy    Follow Up In Physical Therapy          Subjective    General: Patient with a history of knee pain  for 2 years.  Onset was insidiious.  Patient with pain with steps.  Patient treated with meloxicam - only a little help.  .  Patient complains of pain in the region of the ant left knee, ache pain, 6/10 at worst last 7 days.  Patient's pain is worse with steps, sit to stand, and squatting.  Functionally, patient is unable to do yoga.  Being assessed for tumor in Uterus.         Precautions: HTN       Prior Level of Function: No limits       Objective     Postural deviation: valgus  bilateral knee ROM WNL  left Knee strength 4/5 quads and 4/5 hamstrings  Right Knee strength 4+/5 quads and 4+/5 hamstring  Gait deviation: normal gait  Tender to palpation at the retropatellar  Balance: 10 sec+  each  Special Tests: pain with patellar compression left    Outcome Measures:  Other Measures  Lower Extremity Funtional Score (LEFS): 82.5%     Treatments:  Patient instructed in HEP including: ball squeezes, hip abd/er with blue theraband, SAQ, SLR and quad set 20 times each (10 minutes)     EDUCATION: HEP       Goals:  Active       PT Problem       PT Goal 1       Start:  07/23/24    Expected End:  10/21/24         Goal Note       Knee pain 3/10 or less  Able to do steps without pain              PT Goal 2       Start:  07/23/24    Expected End:  10/21/24         Goal Note       Improve LEFS by 10%              PT Goal 3       Start:  07/23/24    Expected End:  10/21/24         Goal Note       5/5 knee strength               PT Goal 4       Start:  07/23/24    Expected End:  10/21/24         Goal Note       Able to do steps without pain

## 2024-08-08 ENCOUNTER — APPOINTMENT (OUTPATIENT)
Dept: PHYSICAL THERAPY | Facility: CLINIC | Age: 39
End: 2024-08-08
Payer: COMMERCIAL

## 2024-08-13 ENCOUNTER — APPOINTMENT (OUTPATIENT)
Dept: RADIOLOGY | Facility: HOSPITAL | Age: 39
End: 2024-08-13
Payer: COMMERCIAL

## 2024-08-14 ENCOUNTER — APPOINTMENT (OUTPATIENT)
Dept: PHYSICAL THERAPY | Facility: CLINIC | Age: 39
End: 2024-08-14
Payer: COMMERCIAL

## 2024-08-27 ENCOUNTER — HOSPITAL ENCOUNTER (OUTPATIENT)
Dept: RADIOLOGY | Facility: HOSPITAL | Age: 39
Discharge: HOME | End: 2024-08-27
Payer: COMMERCIAL

## 2024-08-27 DIAGNOSIS — R10.2 PELVIC PAIN: ICD-10-CM

## 2024-08-27 PROCEDURE — 76830 TRANSVAGINAL US NON-OB: CPT | Performed by: STUDENT IN AN ORGANIZED HEALTH CARE EDUCATION/TRAINING PROGRAM

## 2024-08-27 PROCEDURE — 76856 US EXAM PELVIC COMPLETE: CPT

## 2024-08-27 PROCEDURE — 76856 US EXAM PELVIC COMPLETE: CPT | Performed by: STUDENT IN AN ORGANIZED HEALTH CARE EDUCATION/TRAINING PROGRAM

## 2024-08-28 ENCOUNTER — OFFICE VISIT (OUTPATIENT)
Dept: OBSTETRICS AND GYNECOLOGY | Facility: CLINIC | Age: 39
End: 2024-08-28
Payer: COMMERCIAL

## 2024-08-28 VITALS
BODY MASS INDEX: 31.64 KG/M2 | SYSTOLIC BLOOD PRESSURE: 122 MMHG | DIASTOLIC BLOOD PRESSURE: 80 MMHG | HEART RATE: 84 BPM | WEIGHT: 173 LBS

## 2024-08-28 DIAGNOSIS — Z97.5 BREAKTHROUGH BLEEDING ASSOCIATED WITH INTRAUTERINE DEVICE (IUD): Primary | ICD-10-CM

## 2024-08-28 DIAGNOSIS — I10 PRIMARY HYPERTENSION: ICD-10-CM

## 2024-08-28 DIAGNOSIS — N92.1 BREAKTHROUGH BLEEDING ASSOCIATED WITH INTRAUTERINE DEVICE (IUD): Primary | ICD-10-CM

## 2024-08-28 DIAGNOSIS — M54.6 ACUTE MIDLINE THORACIC BACK PAIN: ICD-10-CM

## 2024-08-28 PROCEDURE — 3074F SYST BP LT 130 MM HG: CPT | Performed by: OBSTETRICS & GYNECOLOGY

## 2024-08-28 PROCEDURE — 99213 OFFICE O/P EST LOW 20 MIN: CPT | Performed by: OBSTETRICS & GYNECOLOGY

## 2024-08-28 PROCEDURE — 99213 OFFICE O/P EST LOW 20 MIN: CPT | Mod: TH | Performed by: OBSTETRICS & GYNECOLOGY

## 2024-08-28 PROCEDURE — 3079F DIAST BP 80-89 MM HG: CPT | Performed by: OBSTETRICS & GYNECOLOGY

## 2024-08-28 RX ORDER — LOSARTAN POTASSIUM 25 MG/1
12.5 TABLET ORAL DAILY
Qty: 45 TABLET | Refills: 3 | Status: SHIPPED | OUTPATIENT
Start: 2024-08-28 | End: 2025-08-28

## 2024-08-28 ASSESSMENT — ENCOUNTER SYMPTOMS
CONSTITUTIONAL NEGATIVE: 0
ALLERGIC/IMMUNOLOGIC NEGATIVE: 0
RESPIRATORY NEGATIVE: 0
CARDIOVASCULAR NEGATIVE: 0
NEUROLOGICAL NEGATIVE: 0
GASTROINTESTINAL NEGATIVE: 0
MUSCULOSKELETAL NEGATIVE: 0
PSYCHIATRIC NEGATIVE: 0
ENDOCRINE NEGATIVE: 0
EYES NEGATIVE: 0
HEMATOLOGIC/LYMPHATIC NEGATIVE: 0

## 2024-08-28 ASSESSMENT — PAIN SCALES - GENERAL: PAINLEVEL: 0-NO PAIN

## 2024-08-28 NOTE — PROGRESS NOTES
Assessment   There are no diagnoses linked to this encounter.    Brigette Leiva, MS3    Subjective   Solitario Zuniga is a 39 y.o. female here for follow-up of ultrasound for abnormal spotting with IUD.    Solitario was last seen on 6/26/2024 for vaginal discharge and spotting with Mirena IUD in place (placed 7/2023). Ultrasound on 8/27/2024 showed benign uterus and endometrium with IUD in place. Today the patient states that her spotting and discharge have decreased since beginning meloxicam with last visit. She endorses 7-8 days of spotting with her cycle. She endorses occasional mild lower abdomen.  She would like to keep the IUD in place and revisit removal if prolonged spotting recurs.    Solitario has also been monitoring her blood pressure due to high BP with last visit. She reports improvement in blood pressures after taking 12.5mg of losartan daily. She reports trying 50mg and 25mg doses, both of which caused BP to be too low.     GynHx:  Menses: regular, 7-8 days of spotting with Mirena IUD  STIs: denies  Sexual Activity: men, monogamous, no complaints  Contraception: Mirena IUD placed 7/2023  Pap Hx: July 2023 - neg cotest    Past med hx and past surg hx reviewed and notable for:   - MDD - not requiring meds  - Migraines - on Nurtec  - Hypothyroidism - on 25mcg Synthroid  - HTN - on Losartan 12.5mg daily    Objective   There were no vitals taken for this visit.     General:   Alert and oriented, in no acute distress   Neck: Supple. No visible thyromegaly.    Breast/Axilla: Normal to palpation bilaterally without masses, skin changes, or nipple discharge.    Abdomen: Soft, non-tender, without masses or organomegaly   Vulva: Normal architecture without erythema, masses, or lesions.    Vagina: Normal mucosa without lesions, masses, or atrophy. No abnormal vaginal discharge.    Cervix: Normal without masses, lesions, or signs of cervicitis.    Uterus: Normal mobile, non-enlarged uterus    Adnexa: Normal without  masses or lesions   Pelvic Floor No POP noted. No high tone pelvic floor    Psych Normal affect. Normal mood.

## 2024-08-29 NOTE — PROGRESS NOTES
Assessment   40 YO  with resolved AUB after Mirena insertion for contraception.  FU for APE.  FU with PCP: HTN    Frieda Padgett MD     Subjective   40 YO  here for FU AUB on Mirena.     Ultrasound on 2024 showed normal sized uterus without fibroids but possible suggestion of adenomyosis, and Mirena IUD properly in place.   Today the patient states that her spotting and discharge have decreased since beginning meloxicam with last visit.   She endorses 7-8 days of spotting with her cycle. She endorses occasional mild lower abdomen.  She would like to keep the IUD in place and revisit removal if prolonged spotting recurs.     Solitario has also been monitoring her blood pressure due to high BP with last visit. She reports improvement in blood pressures after taking 12.5mg of losartan daily. She reports trying 50mg and 25mg doses, both of which caused BP to be too low.    She has had some bilateral back, shoulder, and hip pain and is seeing PT. She is interested in massage referral.     GynHx:  Menses: regular, 7-8 days of spotting with Mirena IUD  STIs: denies  Sexual Activity: men, monogamous, no complaints  Contraception: Mirena IUD placed 2023  Pap Hx: 2023 - neg cotest     Past med hx and past surg hx reviewed and notable for:   - MDD - not requiring meds  - Migraines - on Nurtec  - Hypothyroidism - on 25mcg Synthroid  - HTN - on Losartan 12.5mg daily    Objective   /80   Pulse 84   Wt 78.5 kg (173 lb)   LMP 2024   BMI 31.64 kg/m²      General:   Alert and oriented, in no acute distress   Skin: No significant acne. No hirsutism.   Neck: Supple. No visible thyromegaly.    Psych Normal affect. Normal mood.

## 2024-09-13 DIAGNOSIS — M17.12 OSTEOARTHRITIS OF LEFT PATELLOFEMORAL JOINT: ICD-10-CM

## 2024-09-13 RX ORDER — MELOXICAM 15 MG/1
15 TABLET ORAL DAILY
Qty: 30 TABLET | Refills: 1 | Status: SHIPPED | OUTPATIENT
Start: 2024-09-13

## 2024-10-15 ENCOUNTER — DOCUMENTATION (OUTPATIENT)
Dept: PHYSICAL THERAPY | Facility: CLINIC | Age: 39
End: 2024-10-15
Payer: COMMERCIAL

## 2024-10-15 NOTE — PROGRESS NOTES
Physical Therapy    Discharge Summary    Name: Solitario Zuniga  MRN: [id number]  : 1985  Date: 10/15/24    Discharge Summary: PT    Discharge Information: Date of discharge 10/15/24, Date of last visit 24, Date of evaluation 24, Number of attended visits 1, Referred by Radha, and Referred for PF syndrome    Therapy Summary: Patient seen in PT for one visits for left pf oa.  Patient did not follow up in PT after her initial evaluation.      Discharge Status: Status unknown.      Rehab Discharge Reason: Failed to schedule and/or keep follow-up appointment(s)

## 2024-11-29 DIAGNOSIS — M17.12 OSTEOARTHRITIS OF LEFT PATELLOFEMORAL JOINT: ICD-10-CM

## 2024-11-29 RX ORDER — MELOXICAM 15 MG/1
15 TABLET ORAL DAILY
Qty: 30 TABLET | Refills: 1 | Status: SHIPPED | OUTPATIENT
Start: 2024-11-29

## 2024-12-29 ASSESSMENT — SLIT LAMP EXAM - LIDS
COMMENTS: GOOD POSITION
COMMENTS: GOOD POSITION

## 2024-12-29 ASSESSMENT — CUP TO DISC RATIO
OD_RATIO: .25
OS_RATIO: .25

## 2024-12-29 ASSESSMENT — EXTERNAL EXAM - LEFT EYE: OS_EXAM: NORMAL

## 2024-12-29 ASSESSMENT — EXTERNAL EXAM - RIGHT EYE: OD_EXAM: NORMAL

## 2024-12-29 NOTE — PROGRESS NOTES
LOV 1/11/22      Eye tearing  -Some eye tearing in the morning upon awakening, usually after has not had much sleep.   -Likely due to dryness - recommended to use artificial tears PRN.     Lattice degeneration, bilateral  -Discussed exam finding and prognosis with patient  -No retinal tear or detachment seen on exam. Retinal detachment symptoms discussed.     [id number]  -History of wearing glasses in the past due to headaches, now no longer having headaches. Denies difficulty with distance vision. -Refraction performed today for diagnostic purposes only and without specific intent to dispense Rx. Glasses Rx not dispensed today.       No history of intraocular surgery/refractive surgery.   No FH of AMD/glaucoma  Mother had laser procedure for her eye (retinal laser for tear)?

## 2025-01-06 ENCOUNTER — APPOINTMENT (OUTPATIENT)
Dept: OPHTHALMOLOGY | Facility: CLINIC | Age: 40
End: 2025-01-06
Payer: COMMERCIAL

## 2025-01-06 DIAGNOSIS — H52.13 MYOPIA OF BOTH EYES: ICD-10-CM

## 2025-01-06 DIAGNOSIS — H04.209: Primary | ICD-10-CM

## 2025-01-06 DIAGNOSIS — H35.413 LATTICE DEGENERATION OF BOTH RETINAS: ICD-10-CM

## 2025-01-06 PROBLEM — H04.123 DRY EYES, BILATERAL: Status: ACTIVE | Noted: 2025-01-06

## 2025-01-06 PROCEDURE — 92014 COMPRE OPH EXAM EST PT 1/>: CPT | Performed by: OPHTHALMOLOGY

## 2025-01-06 ASSESSMENT — REFRACTION_MANIFEST
METHOD_AUTOREFRACTION: 1
OD_SPHERE: -0.25
OS_SPHERE: -0.25
OS_CYLINDER: SPHERE
OD_SPHERE: -0.25
OD_AXIS: 019
OS_SPHERE: -0.25
OD_CYLINDER: -0.25
OD_AXIS: 019
OD_CYLINDER: -0.50
OS_CYLINDER: SPHERE

## 2025-01-06 ASSESSMENT — VISUAL ACUITY
OS_SC: 20/20
OD_SC: 20/20
METHOD: SNELLEN - LINEAR

## 2025-01-06 ASSESSMENT — CONF VISUAL FIELD
OD_NORMAL: 1
OD_INFERIOR_TEMPORAL_RESTRICTION: 0
OS_NORMAL: 1
OD_SUPERIOR_NASAL_RESTRICTION: 0
OS_INFERIOR_TEMPORAL_RESTRICTION: 0
OD_INFERIOR_NASAL_RESTRICTION: 0
OS_INFERIOR_NASAL_RESTRICTION: 0
OS_SUPERIOR_NASAL_RESTRICTION: 0
OS_SUPERIOR_TEMPORAL_RESTRICTION: 0
OD_SUPERIOR_TEMPORAL_RESTRICTION: 0

## 2025-01-06 ASSESSMENT — ENCOUNTER SYMPTOMS: EYES NEGATIVE: 1

## 2025-01-06 ASSESSMENT — TONOMETRY
OS_IOP_MMHG: 14
IOP_METHOD: GOLDMANN APPLANATION
OD_IOP_MMHG: 13

## 2025-02-06 DIAGNOSIS — M17.12 OSTEOARTHRITIS OF LEFT PATELLOFEMORAL JOINT: ICD-10-CM

## 2025-02-06 RX ORDER — MELOXICAM 15 MG/1
15 TABLET ORAL DAILY
Qty: 30 TABLET | Refills: 1 | Status: SHIPPED | OUTPATIENT
Start: 2025-02-06

## 2025-02-25 ENCOUNTER — APPOINTMENT (OUTPATIENT)
Dept: PRIMARY CARE | Facility: CLINIC | Age: 40
End: 2025-02-25
Payer: COMMERCIAL

## 2025-03-04 ENCOUNTER — APPOINTMENT (OUTPATIENT)
Dept: PRIMARY CARE | Facility: CLINIC | Age: 40
End: 2025-03-04
Payer: COMMERCIAL

## 2025-03-04 DIAGNOSIS — I10 BENIGN ESSENTIAL HYPERTENSION: ICD-10-CM

## 2025-03-04 DIAGNOSIS — E03.9 ADULT HYPOTHYROIDISM: Primary | ICD-10-CM

## 2025-03-04 DIAGNOSIS — G43.809 OTHER MIGRAINE WITHOUT STATUS MIGRAINOSUS, NOT INTRACTABLE: ICD-10-CM

## 2025-03-04 DIAGNOSIS — Z00.00 HEALTH CARE MAINTENANCE: Primary | ICD-10-CM

## 2025-03-04 PROCEDURE — 99213 OFFICE O/P EST LOW 20 MIN: CPT | Performed by: INTERNAL MEDICINE

## 2025-03-05 LAB
ALBUMIN SERPL-MCNC: 4.6 G/DL (ref 3.6–5.1)
ALP SERPL-CCNC: 37 U/L (ref 31–125)
ALT SERPL-CCNC: 26 U/L (ref 6–29)
ANION GAP SERPL CALCULATED.4IONS-SCNC: 6 MMOL/L (CALC) (ref 7–17)
AST SERPL-CCNC: 22 U/L (ref 10–30)
BILIRUB SERPL-MCNC: 0.4 MG/DL (ref 0.2–1.2)
BUN SERPL-MCNC: 10 MG/DL (ref 7–25)
CALCIUM SERPL-MCNC: 9.3 MG/DL (ref 8.6–10.2)
CHLORIDE SERPL-SCNC: 107 MMOL/L (ref 98–110)
CHOLEST SERPL-MCNC: 138 MG/DL
CHOLEST/HDLC SERPL: 3.4 (CALC)
CO2 SERPL-SCNC: 27 MMOL/L (ref 20–32)
CREAT SERPL-MCNC: 0.82 MG/DL (ref 0.5–0.97)
EGFRCR SERPLBLD CKD-EPI 2021: 93 ML/MIN/1.73M2
ERYTHROCYTE [DISTWIDTH] IN BLOOD BY AUTOMATED COUNT: 13.2 % (ref 11–15)
GLUCOSE SERPL-MCNC: 92 MG/DL (ref 65–99)
HCT VFR BLD AUTO: 37.8 % (ref 35–45)
HDLC SERPL-MCNC: 41 MG/DL
HGB BLD-MCNC: 11.7 G/DL (ref 11.7–15.5)
LDLC SERPL CALC-MCNC: 78 MG/DL (CALC)
MCH RBC QN AUTO: 26.5 PG (ref 27–33)
MCHC RBC AUTO-ENTMCNC: 31 G/DL (ref 32–36)
MCV RBC AUTO: 85.7 FL (ref 80–100)
NONHDLC SERPL-MCNC: 97 MG/DL (CALC)
PLATELET # BLD AUTO: 188 THOUSAND/UL (ref 140–400)
PMV BLD REES-ECKER: 13.6 FL (ref 7.5–12.5)
POTASSIUM SERPL-SCNC: 4.5 MMOL/L (ref 3.5–5.3)
PROT SERPL-MCNC: 6.8 G/DL (ref 6.1–8.1)
RBC # BLD AUTO: 4.41 MILLION/UL (ref 3.8–5.1)
SODIUM SERPL-SCNC: 140 MMOL/L (ref 135–146)
TRIGL SERPL-MCNC: 103 MG/DL
TSH SERPL-ACNC: 2.48 MIU/L
WBC # BLD AUTO: 5.5 THOUSAND/UL (ref 3.8–10.8)

## 2025-03-08 RX ORDER — LOSARTAN POTASSIUM 25 MG/1
12.5 TABLET ORAL DAILY
Qty: 45 TABLET | Refills: 1 | Status: SHIPPED | OUTPATIENT
Start: 2025-03-08 | End: 2026-03-08

## 2025-03-08 RX ORDER — LEVOTHYROXINE SODIUM 25 UG/1
25 TABLET ORAL DAILY
Qty: 90 TABLET | Refills: 1 | Status: SHIPPED | OUTPATIENT
Start: 2025-03-08

## 2025-03-26 ENCOUNTER — TELEPHONE (OUTPATIENT)
Dept: PRIMARY CARE | Facility: CLINIC | Age: 40
End: 2025-03-26
Payer: COMMERCIAL

## 2025-03-27 DIAGNOSIS — Z00.00 HEALTH CARE MAINTENANCE: ICD-10-CM

## 2025-03-27 DIAGNOSIS — G43.809 OTHER MIGRAINE WITHOUT STATUS MIGRAINOSUS, NOT INTRACTABLE: Primary | ICD-10-CM

## 2025-03-29 NOTE — PROGRESS NOTES
Subjective   Patient ID: Solitario Zuniga is a 39 y.o. female who presents for No chief complaint on file..    HPI the patient for first time septated Franchi no chest pain no shortness of breath no side effect with medication has been dealing with migraine headaches has used anti-inflammatories and more recently the Nurtec which appears to be helpful    Review of Systems    Objective   There were no vitals taken for this visit.    Physical Exam vital signs noted blood pressure 121/73 alert and oriented x 3 NCAT no lid lag no thyromegaly no JVD chest clear to auscultation cor regular rate and rhythm S1-S2 without murmur gallop or rub extremities no clubbing cyanosis or edema normal distal pulses DTR 2+    Assessment/Plan  impression migraine headache hypertension hypothyroid    Plan has used mobic for orthopaedic issues discussed with the Nurtec is used for the migraine headache along with migraine avoidance check comprehensive panel advised on glucose potassium and kidney function as well as liver function check lipid panel advised on cholesterol and blood pressure medication check CBC advised on blood count check TSH advised on thyroid and thyroid medication good diet regular exercise good water consumption recheck three months based on labs

## 2025-08-11 ENCOUNTER — OFFICE VISIT (OUTPATIENT)
Dept: OBSTETRICS AND GYNECOLOGY | Facility: CLINIC | Age: 40
End: 2025-08-11
Payer: COMMERCIAL

## 2025-08-11 VITALS
SYSTOLIC BLOOD PRESSURE: 109 MMHG | HEIGHT: 62 IN | BODY MASS INDEX: 32.11 KG/M2 | WEIGHT: 174.5 LBS | HEART RATE: 68 BPM | DIASTOLIC BLOOD PRESSURE: 78 MMHG

## 2025-08-11 DIAGNOSIS — Z12.31 ENCOUNTER FOR SCREENING MAMMOGRAM FOR MALIGNANT NEOPLASM OF BREAST: ICD-10-CM

## 2025-08-11 DIAGNOSIS — Z01.419 WOMEN'S ANNUAL ROUTINE GYNECOLOGICAL EXAMINATION: Primary | ICD-10-CM

## 2025-08-11 DIAGNOSIS — N32.81 OVERACTIVE BLADDER: ICD-10-CM

## 2025-08-11 DIAGNOSIS — E88.819 INSULIN RESISTANCE: ICD-10-CM

## 2025-08-11 PROCEDURE — 3008F BODY MASS INDEX DOCD: CPT | Performed by: OBSTETRICS & GYNECOLOGY

## 2025-08-11 PROCEDURE — 99396 PREV VISIT EST AGE 40-64: CPT | Performed by: OBSTETRICS & GYNECOLOGY

## 2025-08-11 PROCEDURE — 99212 OFFICE O/P EST SF 10 MIN: CPT | Performed by: OBSTETRICS & GYNECOLOGY

## 2025-08-11 ASSESSMENT — PAIN SCALES - GENERAL: PAINLEVEL_OUTOF10: 0-NO PAIN

## 2025-09-03 DIAGNOSIS — Z00.00 HEALTH CARE MAINTENANCE: ICD-10-CM

## 2025-09-04 ENCOUNTER — APPOINTMENT (OUTPATIENT)
Dept: RADIOLOGY | Facility: CLINIC | Age: 40
End: 2025-09-04
Payer: COMMERCIAL

## 2025-09-06 RX ORDER — LEVOTHYROXINE SODIUM 25 UG/1
25 TABLET ORAL DAILY
Qty: 90 TABLET | Refills: 1 | Status: SHIPPED | OUTPATIENT
Start: 2025-09-06

## 2025-09-07 ENCOUNTER — RESULTS FOLLOW-UP (OUTPATIENT)
Dept: OBSTETRICS AND GYNECOLOGY | Facility: CLINIC | Age: 40
End: 2025-09-07
Payer: COMMERCIAL

## 2025-09-07 DIAGNOSIS — R92.8 ABNORMAL MAMMOGRAM OF LEFT BREAST: Primary | ICD-10-CM

## 2025-09-07 LAB — GLUCOSE P FAST SERPL-MCNC: 87 MG/DL (ref 65–99)

## 2025-10-13 ENCOUNTER — APPOINTMENT (OUTPATIENT)
Dept: DERMATOLOGY | Facility: CLINIC | Age: 40
End: 2025-10-13
Payer: COMMERCIAL